# Patient Record
Sex: FEMALE | Race: WHITE | NOT HISPANIC OR LATINO
[De-identification: names, ages, dates, MRNs, and addresses within clinical notes are randomized per-mention and may not be internally consistent; named-entity substitution may affect disease eponyms.]

---

## 2018-02-26 ENCOUNTER — TRANSCRIPTION ENCOUNTER (OUTPATIENT)
Age: 28
End: 2018-02-26

## 2018-06-22 ENCOUNTER — TRANSCRIPTION ENCOUNTER (OUTPATIENT)
Age: 28
End: 2018-06-22

## 2018-10-08 ENCOUNTER — TRANSCRIPTION ENCOUNTER (OUTPATIENT)
Age: 28
End: 2018-10-08

## 2018-10-22 ENCOUNTER — TRANSCRIPTION ENCOUNTER (OUTPATIENT)
Age: 28
End: 2018-10-22

## 2022-01-04 PROBLEM — Z00.00 ENCOUNTER FOR PREVENTIVE HEALTH EXAMINATION: Status: ACTIVE | Noted: 2022-01-04

## 2022-01-13 ENCOUNTER — APPOINTMENT (OUTPATIENT)
Dept: PHYSICAL MEDICINE AND REHAB | Facility: CLINIC | Age: 32
End: 2022-01-13
Payer: COMMERCIAL

## 2022-01-13 VITALS
HEART RATE: 80 BPM | DIASTOLIC BLOOD PRESSURE: 71 MMHG | BODY MASS INDEX: 25.27 KG/M2 | SYSTOLIC BLOOD PRESSURE: 108 MMHG | HEIGHT: 64 IN | TEMPERATURE: 97.4 F | WEIGHT: 148 LBS

## 2022-01-13 PROCEDURE — 99204 OFFICE O/P NEW MOD 45 MIN: CPT | Mod: GC

## 2022-01-27 ENCOUNTER — APPOINTMENT (OUTPATIENT)
Dept: PHYSICAL MEDICINE AND REHAB | Facility: CLINIC | Age: 32
End: 2022-01-27
Payer: COMMERCIAL

## 2022-01-27 VITALS
BODY MASS INDEX: 25.27 KG/M2 | DIASTOLIC BLOOD PRESSURE: 60 MMHG | TEMPERATURE: 97.1 F | SYSTOLIC BLOOD PRESSURE: 106 MMHG | HEART RATE: 74 BPM | WEIGHT: 148 LBS | HEIGHT: 64 IN

## 2022-01-27 DIAGNOSIS — S09.90XA UNSPECIFIED INJURY OF HEAD, INITIAL ENCOUNTER: ICD-10-CM

## 2022-01-27 DIAGNOSIS — G43.909 MIGRAINE, UNSPECIFIED, NOT INTRACTABLE, W/OUT STATUS MIGRAINOSUS: ICD-10-CM

## 2022-01-27 PROCEDURE — 99214 OFFICE O/P EST MOD 30 MIN: CPT | Mod: GC

## 2022-01-27 NOTE — PHYSICAL EXAM
[Vestibular Ocular Reflex Normal] : Vestibular ocular reflex normal [Full Cervical ROM] : Full cervical ROM is present [Cervical Tenderness] : Cervical tenderness is present [Location: ___] : Location: [unfilled] [Normal] : Patient has a normal gait. [Double Stance] : Double stance is normal [Saccades] : No saccades [Nystagmus] : No nystagmus

## 2022-01-27 NOTE — REASON FOR VISIT
[Follow-up Evaluation] : a follow-up evaluation [Head Injury Evaluation] : a head injury evaluation [Headache] : a headache

## 2022-01-27 NOTE — END OF VISIT
[] : Fellow [FreeTextEntry3] : Ongoing physical and mental activity is recommended to maintain overall health. Reintegration into activities as tolerated while monitoring the symptoms was recommended.  \par \par Return to work as tolerated.\par FU   PRN. Answered all questions, patient was pleased with information provided and demonstrated understanding.

## 2022-01-27 NOTE — ASSESSMENT
[FreeTextEntry1] : Ms. Rodriguez is a 31 year old female who suffered a head injury on 1/3/21. Patient has made a good improvement so far. She has returned to work part-time since the last visit. She is ready to return to work full time. Patient is to return to work full-time starting 1/31/22 with breaks as needed for the first two weeks of full time duty. \par \par Given the fact that the patient's premorbid chronic migraines are daily, the patient was counselled on potential migraine prevention medications and the need for a neurology headache specialist. Referral was provided.\par \par Follow up as needed.

## 2022-01-28 ENCOUNTER — TRANSCRIPTION ENCOUNTER (OUTPATIENT)
Age: 32
End: 2022-01-28

## 2024-04-03 ENCOUNTER — APPOINTMENT (OUTPATIENT)
Dept: ORTHOPEDIC SURGERY | Facility: CLINIC | Age: 34
End: 2024-04-03
Payer: COMMERCIAL

## 2024-04-03 VITALS — HEIGHT: 64 IN | WEIGHT: 150 LBS | BODY MASS INDEX: 25.61 KG/M2

## 2024-04-03 DIAGNOSIS — Z78.9 OTHER SPECIFIED HEALTH STATUS: ICD-10-CM

## 2024-04-03 DIAGNOSIS — G56.03 CARPAL TUNNEL SYNDROM,BILATERAL UPPER LIMBS: ICD-10-CM

## 2024-04-03 DIAGNOSIS — M77.11 LATERAL EPICONDYLITIS, RIGHT ELBOW: ICD-10-CM

## 2024-04-03 DIAGNOSIS — M77.12 LATERAL EPICONDYLITIS, LEFT ELBOW: ICD-10-CM

## 2024-04-03 PROCEDURE — 99203 OFFICE O/P NEW LOW 30 MIN: CPT

## 2024-04-03 PROCEDURE — 73080 X-RAY EXAM OF ELBOW: CPT | Mod: 50

## 2024-04-03 RX ORDER — ESCITALOPRAM OXALATE 5 MG/1
5 TABLET, FILM COATED ORAL
Refills: 0 | Status: ACTIVE | COMMUNITY

## 2024-04-03 RX ORDER — SPIRONOLACTONE 100 MG/1
100 TABLET ORAL
Refills: 0 | Status: ACTIVE | COMMUNITY

## 2024-04-03 RX ORDER — MULTIVIT-MIN/IRON/FOLIC ACID/K 18-600-40
50 MCG CAPSULE ORAL
Refills: 0 | Status: ACTIVE | COMMUNITY

## 2024-04-03 RX ORDER — BUSPIRONE HYDROCHLORIDE 5 MG/1
5 TABLET ORAL
Refills: 0 | Status: ACTIVE | COMMUNITY

## 2024-04-03 NOTE — HISTORY OF PRESENT ILLNESS
[Left Arm] : left arm [Right Arm] : right arm [Gradual] : gradual [5] : 5 [Dull/Aching] : dull/aching [Constant] : constant [Rest] : rest [Full time] : Work status: full time [de-identified] : Patient is a 33-year-old right-hand-dominant female with pain lateral elbows radiating to her forearms October 2023.  No specific injury.  The left side bothers her more than the right.  She has been doing some knitting and does repetitive computer work.  She had been seen at urgent care.  She had oral cortisone with some improvement.  She has been doing home exercises.  Using tennis elbow support prn. She also has occasional numbness and tingling in her first 3 fingers of both hands with activities.  No awakening from sleep tonight.  No weakness.  No chest pain or shortness of breath.  Taking Tylenol prn [] : Post Surgical Visit: no [FreeTextEntry7] : B Bicep  [de-identified] : Gripping [de-identified] : 8/21/2018 [de-identified] : Dr. Davenport  [de-identified] :  Dept

## 2024-04-03 NOTE — IMAGING
[Bilateral] : elbow bilaterally [de-identified] : Right elbow No swelling Full active motion Mild tenderness lateral epicondylar origin and proximal extensor forearm musculature Mild pain with wrist dorsiflexion against resistance Collaterals are stable  Right wrist and hand No swelling Full active range of motion No tenderness Sensation intact Intrinsic strength intact Mildly positive Tinel median nerve Radial pulse 2+  Left elbow No swelling Full active motion Mild to moderate tenderness lateral epicondylar origin and proximal extensor forearm musculature Mild pain with wrist dorsiflexion against resistance Collaterals are stable  Left wrist and hand No swelling Full active range of motion No tenderness Sensation intact Intrinsic strength intact Mildly positive Tinel median nerve Radial pulse 2+ [FreeTextEntry1] : Reviewed and interpreted.  Right elbow AP, lateral and oblique views-negative  Reviewed and interpreted.  Left elbow AP, lateral and oblique views-negative

## 2024-04-03 NOTE — DISCUSSION/SUMMARY
[de-identified] : The patient is referred for occupational therapy program to [facility] or moist heat prn She can try Voltaren gel prn Tylenol as needed Continue tennis elbow supports prn during daytime aggravating activities.  Remove when resting and sleeping She can try carpal tunnel splints prn during aggravating activities and sleeping  Impression: Bilateral lateral epicondylitis Bilateral mild carpal tunnel syndrome

## 2024-04-03 NOTE — REASON FOR VISIT
[FreeTextEntry2] : Pain in both elbows over the past 6 months Occasional numbness and tingling both hands

## 2024-05-15 ENCOUNTER — APPOINTMENT (OUTPATIENT)
Dept: ORTHOPEDIC SURGERY | Facility: CLINIC | Age: 34
End: 2024-05-15

## 2024-09-18 ENCOUNTER — APPOINTMENT (OUTPATIENT)
Dept: ORTHOPEDIC SURGERY | Facility: CLINIC | Age: 34
End: 2024-09-18
Payer: COMMERCIAL

## 2024-09-18 VITALS — BODY MASS INDEX: 25.61 KG/M2 | HEIGHT: 64 IN | WEIGHT: 150 LBS

## 2024-09-18 DIAGNOSIS — G56.03 CARPAL TUNNEL SYNDROM,BILATERAL UPPER LIMBS: ICD-10-CM

## 2024-09-18 DIAGNOSIS — M75.52 BURSITIS OF LEFT SHOULDER: ICD-10-CM

## 2024-09-18 DIAGNOSIS — M75.51 BURSITIS OF RIGHT SHOULDER: ICD-10-CM

## 2024-09-18 DIAGNOSIS — M77.11 LATERAL EPICONDYLITIS, RIGHT ELBOW: ICD-10-CM

## 2024-09-18 DIAGNOSIS — M77.12 LATERAL EPICONDYLITIS, LEFT ELBOW: ICD-10-CM

## 2024-09-18 PROCEDURE — 73030 X-RAY EXAM OF SHOULDER: CPT | Mod: 50

## 2024-09-18 PROCEDURE — 99214 OFFICE O/P EST MOD 30 MIN: CPT | Mod: 25

## 2024-09-18 RX ORDER — DEXTROAMPHETAMINE SULFATE, DEXTROAMPHETAMINE SACCHARATE, AMPHETAMINE SULFATE AND AMPHETAMINE ASPARTATE 2.5; 2.5; 2.5; 2.5 MG/1; MG/1; MG/1; MG/1
10 CAPSULE, EXTENDED RELEASE ORAL
Refills: 0 | Status: ACTIVE | COMMUNITY

## 2024-09-18 NOTE — HISTORY OF PRESENT ILLNESS
[Left Arm] : left arm [Right Arm] : right arm [Gradual] : gradual [5] : 5 [Dull/Aching] : dull/aching [Constant] : constant [Rest] : rest [] : yes [Full time] : Work status: full time

## 2024-09-18 NOTE — IMAGING
[FreeTextEntry1] : Reviewed and interpreted.  Right elbow AP, lateral and oblique views-negative  Reviewed and interpreted.  Left elbow AP, lateral and oblique views-negative [Bilateral] : shoulder bilaterally

## 2025-03-29 ENCOUNTER — NON-APPOINTMENT (OUTPATIENT)
Age: 35
End: 2025-03-29

## 2025-04-08 ENCOUNTER — APPOINTMENT (OUTPATIENT)
Dept: ORTHOPEDIC SURGERY | Facility: CLINIC | Age: 35
End: 2025-04-08
Payer: COMMERCIAL

## 2025-04-08 VITALS — HEIGHT: 64 IN | BODY MASS INDEX: 25.61 KG/M2 | WEIGHT: 150 LBS

## 2025-04-08 DIAGNOSIS — M65.942 UNSPECIFIED SYNOVITIS AND TENOSYNOVITIS, LEFT HAND: ICD-10-CM

## 2025-04-08 DIAGNOSIS — G56.03 CARPAL TUNNEL SYNDROM,BILATERAL UPPER LIMBS: ICD-10-CM

## 2025-04-08 DIAGNOSIS — M65.941 UNSPECIFIED SYNOVITIS AND TENOSYNOVITIS, RIGHT HAND: ICD-10-CM

## 2025-04-08 PROCEDURE — 99213 OFFICE O/P EST LOW 20 MIN: CPT

## 2025-04-08 PROCEDURE — 73130 X-RAY EXAM OF HAND: CPT | Mod: 50

## 2025-04-08 RX ORDER — DEXTROAMPHETAMINE SULFATE, DEXTROAMPHETAMINE SACCHARATE, AMPHETAMINE SULFATE AND AMPHETAMINE ASPARTATE 3.75; 3.75; 3.75; 3.75 MG/1; MG/1; MG/1; MG/1
15 CAPSULE, EXTENDED RELEASE ORAL
Refills: 0 | Status: ACTIVE | COMMUNITY

## 2025-07-01 ENCOUNTER — APPOINTMENT (OUTPATIENT)
Dept: PEDIATRIC ALLERGY IMMUNOLOGY | Facility: CLINIC | Age: 35
End: 2025-07-01

## 2025-07-12 ENCOUNTER — NON-APPOINTMENT (OUTPATIENT)
Age: 35
End: 2025-07-12